# Patient Record
Sex: FEMALE | Race: BLACK OR AFRICAN AMERICAN
[De-identification: names, ages, dates, MRNs, and addresses within clinical notes are randomized per-mention and may not be internally consistent; named-entity substitution may affect disease eponyms.]

---

## 2016-12-28 VITALS
DIASTOLIC BLOOD PRESSURE: 68 MMHG | SYSTOLIC BLOOD PRESSURE: 160 MMHG | SYSTOLIC BLOOD PRESSURE: 160 MMHG | DIASTOLIC BLOOD PRESSURE: 68 MMHG

## 2017-04-27 ENCOUNTER — HOSPITAL ENCOUNTER (OUTPATIENT)
Dept: HOSPITAL 61 - ECHO | Age: 77
Discharge: HOME | End: 2017-04-27
Attending: INTERNAL MEDICINE
Payer: MEDICARE

## 2017-04-27 DIAGNOSIS — I08.1: Primary | ICD-10-CM

## 2017-04-27 PROCEDURE — 93306 TTE W/DOPPLER COMPLETE: CPT

## 2017-04-27 NOTE — CARD
--------------- APPROVED REPORT --------------





EXAM: Two-dimensional and M-mode echocardiogram with Doppler and color Doppler.



Other Information 

Quality : Average

Rhythm : NSR



INDICATION

Cardiac Disease: CAD 



2D DIMENSIONS 

Left Atrium(2D)4.1 (1.6-4.0cm)IVSd1.5 (0.7-1.1cm)

Aortic Root(2D)2.7 (2.0-3.7cm)LVDd5.1 (3.9-5.9cm)

LVOT Diameter2.2 (1.8-2.4cm)PWd1.5 (0.7-1.1cm)

LVDs3.6 (2.5-4.0cm)FS (%) 29.6 %

SV71.2 mlLVEF(%)56.3 (>50%)



Aortic Valve

AoV Peak Mert.145.6cm/sAoV VTI36.8cm

AO Peak GR.8.5mmHgLVOT Peak Mert.115.8cm/s

AO Mean GR.5mmHgAVA (VMAX)3.13cm2



Mitral Valve

MV E Giucwwkp02.4cm/sMV E Peak Gr.5mmHg

MV DECEL [id number] A Eledrkla91.0cm/s

MV E Mean Gr.1mmHgMV SRJ73rf

E/A  Ratio0.9MV A Xwykxthd332md

MVA (PHT)2.97cm2



Tricuspid Valve

TR P. Rptieowk058tc/sRAP BCRPFPIG8xqTj

TR Peak Gr.39zpNmCYJU02ssPj



 LEFT VENTRICLE 

The left ventricle is normal size. There is mild concentric left ventricular hypertrophy. Left ventri
sharmaine systolic function is normal. The Ejection Fraction is 55-60%. There is normal LV segmental wall m
otion. The left ventricular diastolic function and filling is normal for age.



 RIGHT VENTRICLE 

The right ventricle is normal size. The right ventricular systolic function is normal.



 ATRIA 

The left atrium is borderline dilated. The right atrium size is normal. The interatrial septum is int
act with no evidence for an atrial septal defect or patent foramen ovale as noted on 2-D or Doppler i
carmina.



 AORTIC VALVE 

The aortic valve is normal in structure and function. The aortic valve is trileaflet. Doppler and Col
or Flow revealed no significant aortic regurgitation. There is no significant aortic valvular stenosi
s.



 MITRAL VALVE 

The mitral valve is normal in structure and function. There is no mitral valve stenosis. Doppler and 
Color Flow revealed trace to mild mitral regurgitation.



 TRICUSPID VALVE 

The tricuspid valve is normal in structure and function. Doppler and Color Flow revealed mild tricusp
id regurgitation. The PA pressure was estimated at 23 mmHg. There is no tricuspid valve stenosis.



 PULMONIC VALVE 

The pulmonic valve is not well visualized. Doppler and Color Flow revealed no pulmonic valvular regur
gitation. There is no pulmonic valvular stenosis.



 GREAT VESSELS 

The aortic root is normal in size. The IVC is normal in size and collapses >50% with inspiration.



 PERICARDIAL EFFUSION 

There is no evidence of significant pericardial effusion.



Critical Notification

Critical Value: No



<Conclusion>

Left ventricle systolic function is normal.

The Ejection Fraction is 55-60%.

There is normal LV segmental wall motion.

Trace to mild mitral regurgitation.

Mild tricuspid regurgitation.

The PA pressure was estimated at 23 mmHg.

There is no evidence of significant pericardial effusion.

## 2017-05-17 ENCOUNTER — HOSPITAL ENCOUNTER (OUTPATIENT)
Dept: HOSPITAL 61 - NM | Age: 77
Discharge: HOME | End: 2017-05-17
Attending: INTERNAL MEDICINE
Payer: MEDICARE

## 2017-05-17 DIAGNOSIS — I25.10: Primary | ICD-10-CM

## 2017-05-17 PROCEDURE — A9500 TC99M SESTAMIBI: HCPCS

## 2017-05-17 PROCEDURE — 93017 CV STRESS TEST TRACING ONLY: CPT

## 2017-05-17 PROCEDURE — 96376 TX/PRO/DX INJ SAME DRUG ADON: CPT

## 2017-05-17 PROCEDURE — 96375 TX/PRO/DX INJ NEW DRUG ADDON: CPT

## 2017-05-17 PROCEDURE — 78452 HT MUSCLE IMAGE SPECT MULT: CPT

## 2017-05-17 PROCEDURE — 96374 THER/PROPH/DIAG INJ IV PUSH: CPT

## 2017-05-17 NOTE — RAD
--------------- APPROVED REPORT --------------





Test Type:          Pharmacological

Stress Nurse/Tech: Liliya Adame R.N.

Test Indications: CAD

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

Medications:     SEE EMR

Medical History: CVA, DM

Resting ECG:     SR BBB

Resting Heart Rate: 69 bpm

Resting Blood Pressure: 174/65mmHg

Pretest Chest Pain: None



Nurse/Tech Notes

S1S2, lungs coarse throughout, diminished in all lobes. Pt has a hx of COPD with chronic upper respir
atory infections. Former smoker.

Consent: The procedure was explained to the patient in lay terms. Informed consent was witnessed. Rambo
eout was entered into [employer]. History and Stress Test performed by Liliya Adame R.N.



Pharm. Details

Pharmacologic stress testing was performed using 0.4mg per 5ml of regadenoson given intravenously ove
r 7-10 seconds.



Stress Symptoms

Extremely SOA. Retractions noted with inspiration/expiration.



POST EXERCISE

Reason for Termination: Infusion complete

Max HR: 82 bpm

Max Blood Pressure: 157/55mmHg

Blood Pressure response to exercise: Normal blood pressure response during stress.

Heart Rate response to exercise: Normal

Chest Pain: No. 

Arrhythmia: No. 

ST Change: No. 



INTERPRETATION

Stress EKG Conclusion: Baseline EKG showed sinus rhythm.  No ischemic changes at peak stress.  No arr
hythmias.



Imaging Protocol

IMAGE PROTOCOL: Rest Tc-99m/stress Tc-99m 1 day



Rest:            Stress:         Viability:   

Radiopharm.Tc99m QogacnteiRw18y Sestamibi

Dose12.2mCi            30.1mCi            

Duration    15min.           10min.           

Img Date  05/17/2017 05/17/2017      

Inj-Img [id number].           60min.           



Rest Admin Site:IV - Left AntecubitalAdministrator:TABATHA Boss

Stress Admin Site: IV - Left AntecubitalAdministrator: TABATHA Boss



STRESS DATA

End Diast. Vol.106.0mlAv. Heart Rate69.0bpm

End Syst. Vol.32.0mlCO Index BSA0.0L/min

Myocardial Ozww418.0gEject. Mclmvzis59.0%



Stress Rates

Pk. Fill Rate2.65EDV/secLVtime Pk. Fill 126.16msec

Pk. Empty Rate3.94ESV/secLVtime Pk. Bfqnl244.21msec

1/3 Pk. Fill1.65EDV/sec



Stress Scores

Regional WT0.00Summed WT12.00

Regional WM0.00Summed WM3.00



Study quality was good.  Left Ventricular size was Normal at Rest and Stress.

Lung uptake was Normal.  Left Ventricular ejection fraction is 70%.

The rest and stress images show normal perfusion, normal contraction and thickening.



LV Perf. Quant

17 Seg. SSS8.00

17 Seg. SRS1.00

17 Seg. SDS7.00

Stress Defect Extent (% LAD)0.00Rest Defect Extent (% LAD)0.00Rev. Defect Extent (% LAD)0.00

Stress Defect Extent (% LCX) 65.00Rest Defect Extent (% LCX)7.50Rev. Defect Extent (% LCX)65.00

Stress Defect Extent (% RCA)0.00Rest Defect Extent (% RCA)0.00Rev. Defect Extent (% RCA)0.00

Stress Defect Extent (% CAT)12.40Rest Defect Extent (% CAT)1.30Rev. Defect Extent (% CAT)12.40



Conclusion

1. Regadenoson cardioisotope stress test did not show any evidence of ischemia or infarct.

2. Normal left ventricular systolic function with ejection fraction calculated at 70%.

3. Low risk for cardiac events.

## 2018-04-24 ENCOUNTER — HOSPITAL ENCOUNTER (OUTPATIENT)
Dept: HOSPITAL 61 - ENDOS | Age: 78
End: 2018-04-24
Attending: INTERNAL MEDICINE
Payer: COMMERCIAL

## 2018-04-24 DIAGNOSIS — Z89.429: ICD-10-CM

## 2018-04-24 DIAGNOSIS — R19.4: Primary | ICD-10-CM

## 2018-04-24 DIAGNOSIS — Z88.8: ICD-10-CM

## 2018-04-24 DIAGNOSIS — I12.0: ICD-10-CM

## 2018-04-24 DIAGNOSIS — Z90.710: ICD-10-CM

## 2018-04-24 DIAGNOSIS — E11.618: ICD-10-CM

## 2018-04-24 DIAGNOSIS — Z79.82: ICD-10-CM

## 2018-04-24 DIAGNOSIS — E11.22: ICD-10-CM

## 2018-04-24 DIAGNOSIS — E78.5: ICD-10-CM

## 2018-04-24 DIAGNOSIS — G47.33: ICD-10-CM

## 2018-04-24 DIAGNOSIS — Z79.4: ICD-10-CM

## 2018-04-24 DIAGNOSIS — I73.9: ICD-10-CM

## 2018-04-24 DIAGNOSIS — Z79.899: ICD-10-CM

## 2018-04-24 DIAGNOSIS — Z90.49: ICD-10-CM

## 2018-04-24 DIAGNOSIS — Z98.890: ICD-10-CM

## 2018-04-24 DIAGNOSIS — Z88.1: ICD-10-CM

## 2018-04-24 DIAGNOSIS — N18.6: ICD-10-CM

## 2018-04-24 DIAGNOSIS — K21.9: ICD-10-CM

## 2018-04-24 LAB — POC GLUCOSE: 174 MG/DL (ref 70–99)

## 2018-04-24 PROCEDURE — 45378 DIAGNOSTIC COLONOSCOPY: CPT

## 2018-04-24 PROCEDURE — 82962 GLUCOSE BLOOD TEST: CPT

## 2018-04-24 RX ADMIN — SODIUM CHLORIDE, SODIUM LACTATE, POTASSIUM CHLORIDE, AND CALCIUM CHLORIDE 1 MLS/HR: .6; .31; .03; .02 INJECTION, SOLUTION INTRAVENOUS at 07:00

## 2018-09-28 ENCOUNTER — HOSPITAL ENCOUNTER (EMERGENCY)
Dept: HOSPITAL 61 - ER | Age: 78
LOS: 1 days | Discharge: HOME | End: 2018-09-29
Payer: COMMERCIAL

## 2018-09-28 VITALS — BODY MASS INDEX: 22.08 KG/M2 | WEIGHT: 120 LBS | HEIGHT: 62 IN

## 2018-09-28 DIAGNOSIS — Z90.49: ICD-10-CM

## 2018-09-28 DIAGNOSIS — Z88.8: ICD-10-CM

## 2018-09-28 DIAGNOSIS — Z88.1: ICD-10-CM

## 2018-09-28 DIAGNOSIS — Z91.041: ICD-10-CM

## 2018-09-28 DIAGNOSIS — Z88.5: ICD-10-CM

## 2018-09-28 DIAGNOSIS — N18.6: ICD-10-CM

## 2018-09-28 DIAGNOSIS — R42: Primary | ICD-10-CM

## 2018-09-28 DIAGNOSIS — I12.0: ICD-10-CM

## 2018-09-28 DIAGNOSIS — E11.22: ICD-10-CM

## 2018-09-28 DIAGNOSIS — Z99.2: ICD-10-CM

## 2018-09-28 DIAGNOSIS — Z94.0: ICD-10-CM

## 2018-09-28 DIAGNOSIS — Z90.710: ICD-10-CM

## 2018-09-28 LAB
% LYMPHS: 9 % (ref 24–48)
% MONOS: 1 % (ref 0–10)
% SEGS: 90 % (ref 35–66)
ANION GAP SERPL CALC-SCNC: 11 MMOL/L (ref 6–14)
ANISOCYTOSIS BLD QL SMEAR: SLIGHT
BASOPHILS # BLD AUTO: 0 X10^3/UL (ref 0–0.2)
BASOPHILS NFR BLD: 1 % (ref 0–3)
BUN SERPL-MCNC: 29 MG/DL (ref 7–20)
CALCIUM SERPL-MCNC: 9.2 MG/DL (ref 8.5–10.1)
CHLORIDE SERPL-SCNC: 102 MMOL/L (ref 98–107)
CO2 SERPL-SCNC: 26 MMOL/L (ref 21–32)
CREAT SERPL-MCNC: 1.1 MG/DL (ref 0.6–1)
EOSINOPHIL NFR BLD: 0 % (ref 0–3)
EOSINOPHIL NFR BLD: 0 X10^3/UL (ref 0–0.7)
ERYTHROCYTE [DISTWIDTH] IN BLOOD BY AUTOMATED COUNT: 17.1 % (ref 11.5–14.5)
GFR SERPLBLD BASED ON 1.73 SQ M-ARVRAT: 57.8 ML/MIN
GLUCOSE SERPL-MCNC: 222 MG/DL (ref 70–99)
HCT VFR BLD CALC: 34.3 % (ref 36–47)
HGB BLD-MCNC: 11.1 G/DL (ref 12–15.5)
HYPOCHROMIA BLD QL SMEAR: (no result)
LYMPHOCYTES # BLD: 0.5 X10^3/UL (ref 1–4.8)
LYMPHOCYTES NFR BLD AUTO: 8 % (ref 24–48)
MCH RBC QN AUTO: 21 PG (ref 25–35)
MCHC RBC AUTO-ENTMCNC: 32 G/DL (ref 31–37)
MCV RBC AUTO: 66 FL (ref 79–100)
MICROCYTES BLD QL SMEAR: (no result)
MONO #: 0.2 X10^3/UL (ref 0–1.1)
MONOCYTES NFR BLD: 3 % (ref 0–9)
NEUT #: 5.7 X10^3UL (ref 1.8–7.7)
NEUTROPHILS NFR BLD AUTO: 88 % (ref 31–73)
PLATELET # BLD AUTO: 279 X10^3/UL (ref 140–400)
PLATELET # BLD EST: ADEQUATE 10*3/UL
POTASSIUM SERPL-SCNC: 4.2 MMOL/L (ref 3.5–5.1)
RBC # BLD AUTO: 5.23 X10^6/UL (ref 3.5–5.4)
SODIUM SERPL-SCNC: 139 MMOL/L (ref 136–145)
WBC # BLD AUTO: 6.5 X10^3/UL (ref 4–11)

## 2018-09-28 PROCEDURE — 84484 ASSAY OF TROPONIN QUANT: CPT

## 2018-09-28 PROCEDURE — 80048 BASIC METABOLIC PNL TOTAL CA: CPT

## 2018-09-28 PROCEDURE — 82962 GLUCOSE BLOOD TEST: CPT

## 2018-09-28 PROCEDURE — 81001 URINALYSIS AUTO W/SCOPE: CPT

## 2018-09-28 PROCEDURE — 83880 ASSAY OF NATRIURETIC PEPTIDE: CPT

## 2018-09-28 PROCEDURE — 96360 HYDRATION IV INFUSION INIT: CPT

## 2018-09-28 PROCEDURE — 96361 HYDRATE IV INFUSION ADD-ON: CPT

## 2018-09-28 PROCEDURE — 85007 BL SMEAR W/DIFF WBC COUNT: CPT

## 2018-09-28 PROCEDURE — 99285 EMERGENCY DEPT VISIT HI MDM: CPT

## 2018-09-28 PROCEDURE — 93005 ELECTROCARDIOGRAM TRACING: CPT

## 2018-09-28 PROCEDURE — 85025 COMPLETE CBC W/AUTO DIFF WBC: CPT

## 2018-09-28 PROCEDURE — 36415 COLL VENOUS BLD VENIPUNCTURE: CPT

## 2018-09-28 NOTE — PHYS DOC
Past Medical History


Past Medical History:  Diabetes-Type II, Hypertension, Renal Disease


Past Surgical History:  Appendectomy, Hysterectomy


Additional Past Surgical Histo:  KIDNEY TRANSPLANT, R FOREARM DIALYSIS SHUNT


Alcohol Use:  None


Drug Use:  None





Adult General


Chief Complaint


Chief Complaint:  DIZZY/LIGHT HEADED





HPI


HPI





Patient is a 78  year old female who presents with dizziness.  Patient states 

she has been feeling dizzy over the course of today and her symptoms worsened 

this evening. She describes her dizziness to be a feeling of lightheadedness 

and that she may lose her balance or fall. She denies vertiginous symptoms. She 

does state she recently increased her dose of insulin at the recommendation of 

her primary care physician. She has not had chest pain or palpitations. No 

diaphoresis. She has been eating and drinking normally. She denies abdominal 

pain. She has been having normal bowel movements. She denies urinary symptoms.





Review of Systems


Review of Systems





Constitutional: Denies fever or chills 


Eyes: Denies change in visual acuity


HENT: Denies nasal congestion 


Respiratory: Denies cough or shortness of breath 


Cardiovascular: No additional information not addressed in HPI 


GI: Denies abdominal pain


: Denies dysuria


Musculoskeletal: Denies back pain 


Integument: Denies rash or skin lesions 


Neurologic: Denies headache, focal weakness or sensory changes 





All other systems were reviewed and found to be within normal limits, except as 

documented in this note.





Current Medications


Current Medications





Current Medications








 Medications


  (Trade)  Dose


 Ordered  Sig/Basilia  Start Time


 Stop Time Status Last Admin


Dose Admin


 


 Hydralazine HCl


  (Apresoline)  25 mg  1X  ONCE  9/29/18 00:00


 9/29/18 00:01 DC 9/29/18 00:06


25 MG


 


 Sodium Chloride  500 ml @ 


 250 mls/hr  1X  ONCE  9/29/18 00:00


 9/29/18 01:59 DC 9/29/18 00:07


250 MLS/HR











Allergies


Allergies





Allergies








Coded Allergies Type Severity Reaction Last Updated Verified


 


  Iodinated Contrast- Oral and IV Dye Allergy Intermediate Itching 12/28/16 Yes


 


  amoxicillin Allergy Intermediate Swelling 12/28/16 Yes


 


  clavulanic acid Allergy Intermediate Swelling 12/28/16 Yes


 


  diphenhydramine Allergy Intermediate Hives 12/28/16 Yes


 


  erythromycin base Allergy Intermediate Rash 12/28/16 Yes


 


  rofecoxib Allergy Intermediate Rash 12/28/16 Yes











Physical Exam


Physical Exam





Constitutional: Well developed, well nourished, no acute distress, non-toxic 

appearance


HENT: Normocephalic, atraumatic, bilateral external ears normal, oropharynx 

moist


Eyes: PERRLA, EOMI, conjunctiva normal


Neck: Normal range of motion, no tenderness, supple 


Cardiovascular:Heart rate regular rhythm, no murmur 


Lungs & Thorax:  Bilateral breath sounds clear to auscultation 


Abdomen: Bowel sounds normal, soft, no tenderness 


Skin: Warm, dry, no erythema  


Extremities: trace edema bilateral LE's 


Neurologic: Alert and oriented X 3, normal motor function


Psychologic: Affect normal





Current Patient Data


Vital Signs





 Vital Signs








  Date Time  Temp Pulse Resp B/P (MAP) Pulse Ox O2 Delivery O2 Flow Rate FiO2


 


9/29/18 00:06  72  197/79    


 


9/28/18 22:46 98.0  20  98 Room Air  





 98.0       








Lab Values





 Laboratory Tests








Test


 9/28/18


22:59 9/28/18


23:00 9/28/18


23:25 9/29/18


00:03


 


Glucose (Fingerstick)


 193 mg/dL


(70-99)  H 


 


 





 


White Blood Count


 


 6.5 x10^3/uL


(4.0-11.0) 


 





 


Red Blood Count


 


 5.23 x10^6/uL


(3.50-5.40) 


 





 


Hemoglobin


 


 11.1 g/dL


(12.0-15.5)  L 


 





 


Hematocrit


 


 34.3 %


(36.0-47.0)  L 


 





 


Mean Corpuscular Volume


 


 66 fL ()


L 


 





 


Mean Corpuscular Hemoglobin


 


 21 pg (25-35)


L 


 





 


Mean Corpuscular Hemoglobin


Concent 


 32 g/dL


(31-37) 


 





 


Red Cell Distribution Width


 


 17.1 %


(11.5-14.5)  H 


 





 


Platelet Count


 


 279 x10^3/uL


(140-400) 


 





 


Neutrophils (%) (Auto)  88 % (31-73)  H  


 


Lymphocytes (%) (Auto)  8 % (24-48)  L  


 


Monocytes (%) (Auto)  3 % (0-9)    


 


Eosinophils (%) (Auto)  0 % (0-3)    


 


Basophils (%) (Auto)  1 % (0-3)    


 


Neutrophils # (Auto)


 


 5.7 x10^3uL


(1.8-7.7) 


 





 


Lymphocytes # (Auto)


 


 0.5 x10^3/uL


(1.0-4.8)  L 


 





 


Monocytes # (Auto)


 


 0.2 x10^3/uL


(0.0-1.1) 


 





 


Eosinophils # (Auto)


 


 0.0 x10^3/uL


(0.0-0.7) 


 





 


Basophils # (Auto)


 


 0.0 x10^3/uL


(0.0-0.2) 


 





 


Segmented Neutrophils %  90 % (35-66)  H  


 


Lymphocytes %  9 % (24-48)  L  


 


Monocytes %  1 % (0-10)    


 


Platelet Estimate


 


 Adequate


(ADEQUATE) 


 





 


Hypochromasia  Mod    


 


Anisocytosis  Slight    


 


Microcytosis  Marked    


 


Sodium Level


 


 


 139 mmol/L


(136-145) 





 


Potassium Level


 


 


 4.2 mmol/L


(3.5-5.1) 





 


Chloride Level


 


 


 102 mmol/L


() 





 


Carbon Dioxide Level


 


 


 26 mmol/L


(21-32) 





 


Anion Gap   11 (6-14)   


 


Blood Urea Nitrogen


 


 


 29 mg/dL


(7-20)  H 





 


Creatinine


 


 


 1.1 mg/dL


(0.6-1.0)  H 





 


Estimated GFR


(Cockcroft-Gault) 


 


 57.8  


 





 


Glucose Level


 


 


 222 mg/dL


(70-99)  H 





 


Calcium Level


 


 


 9.2 mg/dL


(8.5-10.1) 





 


Troponin I Quantitative


 


 


 < 0.017 ng/mL


(0.000-0.055) 





 


NT-Pro-B-Type Natriuretic


Peptide 


 


 782 pg/mL


(0-449)  H 





 


Urine Collection Type    Unknown  


 


Urine Color    Yellow  


 


Urine Clarity    Cloudy  


 


Urine pH    5.5  


 


Urine Specific Gravity    1.010  


 


Urine Protein


 


 


 


 30 mg/dL


(NEG-TRACE)


 


Urine Glucose (UA)


 


 


 


 Negative mg/dL


(NEG)


 


Urine Ketones (Stick)


 


 


 


 Negative mg/dL


(NEG)


 


Urine Blood


 


 


 


 Negative (NEG)





 


Urine Nitrite


 


 


 


 Negative (NEG)





 


Urine Bilirubin


 


 


 


 Negative (NEG)





 


Urine Urobilinogen Dipstick


 


 


 


 1.0 mg/dL (0.2


mg/dL)


 


Urine Leukocyte Esterase


 


 


 


 Negative (NEG)





 


Urine RBC    0 /HPF (0-2)  


 


Urine WBC


 


 


 


 1-4 /HPF (0-4)





 


Urine Squamous Epithelial


Cells 


 


 


 Mod /LPF  





 


Urine Amorphous Sediment    Present /HPF  


 


Urine Bacteria


 


 


 


 0 /HPF (0-FEW)





 


Urine Mucus    Slight /LPF  


 


Urine Yeast    Present /HPF  





 Laboratory Tests


9/28/18 23:00








 Laboratory Tests


9/28/18 23:25














EKG


EKG


NSR.  No STEMI


Interpretation Time:


23:20





Radiology/Procedures


Radiology/Procedures


[]





Course & Med Decision Making


Course & Med Decision Making


Pertinent Labs and Imaging studies reviewed. (See chart for details)





23:35:  Patient is seen and examined. Her physical exam is normal. She does not 

have chest pain. She complains of dizziness. We'll check basic labs and 

urinalysis. We'll give gentle fluid bolus.





02:00:  All results are reviewed. The patient's creatinine is at baseline 

although mildly elevated. Her troponin is not elevated. The rest of her lab 

panel is unremarkable. The urine is mildly elevated. Suspect the patient to be 

a little bit dehydrated. She was given 250 bolus in the ER. She felt 

subjectively improved. She was ambulated about the department and did ambulate 

at baseline with use of the walker which she normally uses. Her family is 

accompanying her this evening and states that she appears to be at her 

baseline. The patient is requesting discharge home. She is discharged from the 

ER and encouraged to follow-up with her primary care doctor or return to the 

emergency department for any new or worsening symptoms.





Dragon Disclaimer


Dragon Disclaimer


This electronic medical record was generated, in whole or in part, using a 

voice recognition dictation system.





Departure


Departure


Referrals:  


DORIS TORRES MD (PCP)











JUDY SEAMAN DO Sep 28, 2018 23:36

## 2018-09-29 VITALS
DIASTOLIC BLOOD PRESSURE: 81 MMHG | DIASTOLIC BLOOD PRESSURE: 81 MMHG | SYSTOLIC BLOOD PRESSURE: 199 MMHG | SYSTOLIC BLOOD PRESSURE: 199 MMHG

## 2018-09-29 LAB
AMORPH SED URNS QL MICRO: PRESENT /HPF
APTT PPP: YELLOW S
BACTERIA #/AREA URNS HPF: 0 /HPF
BILIRUB UR QL STRIP: NEGATIVE
FIBRINOGEN PPP-MCNC: (no result) MG/DL
NITRITE UR QL STRIP: NEGATIVE
PH UR STRIP: 5.5 [PH]
PROT UR STRIP-MCNC: 30 MG/DL
RBC #/AREA URNS HPF: 0 /HPF (ref 0–2)
SQUAMOUS #/AREA URNS LPF: (no result) /LPF
UROBILINOGEN UR-MCNC: 1 MG/DL
WBC #/AREA URNS HPF: (no result) /HPF (ref 0–4)
YEAST #/AREA URNS HPF: PRESENT /HPF

## 2018-09-29 NOTE — EKG
[facility]

              [address] [place] [address]

Test Date:    2018               Test Time:    23:16:28

Pat Name:     ABBEY CLEMENTS             Department:   

Patient ID:   [id number]           Room:          

Gender:       F                        Technician:   

:          [id number]               Requested By: JUDY SEAMAN

Order Number: [id number]           Reading MD:   Carlos Pavon

                                 Measurements

Intervals                              Axis          

Rate:         68                       P:            90

KS:           192                      QRS:          -36

QRSD:         128                      T:            105

QT:           450                                    

QTc:          483                                    

                           Interpretive Statements

SINUS RHYTHM

ABNORMAL LEFT AXIS DEVIATION

NON SPECIFIC INTRAVENTRICULAR BLOCK

ABNORMAL ECG



Electronically Signed On 10-1-2018 12:27:56 CDT by Carlos Pavon

## 2019-01-16 ENCOUNTER — HOSPITAL ENCOUNTER (OUTPATIENT)
Dept: HOSPITAL 61 - ECHO | Age: 79
Discharge: HOME | End: 2019-01-16
Attending: INTERNAL MEDICINE
Payer: COMMERCIAL

## 2019-01-16 DIAGNOSIS — I70.291: ICD-10-CM

## 2019-01-16 DIAGNOSIS — I25.10: Primary | ICD-10-CM

## 2019-01-16 DIAGNOSIS — R00.8: ICD-10-CM

## 2019-01-16 PROCEDURE — 93306 TTE W/DOPPLER COMPLETE: CPT

## 2019-01-16 PROCEDURE — 93925 LOWER EXTREMITY STUDY: CPT

## 2019-01-16 NOTE — CARD
MR#: [id number]

Account#: [id number]

Accession#: [id number]

Date of Study: 01/16/2019

Ordering Physician: RENEA PAVON, 

Referring Physician: RENEA PAVON 

Tech: Khalida Bowens KAJAL





--------------- APPROVED REPORT --------------





EXAM: Two-dimensional and M-mode echocardiogram with Doppler and color Doppler.



Other Information 

Quality : AverageHR: 61bpm

Rhythm : NSR



INDICATION

CAD 



2D DIMENSIONS 

RVDd3.5 (2.9-3.5cm)Left Atrium(2D)4.2 (1.6-4.0cm)

IVSd1.3 (0.7-1.1cm)Aortic Root(2D)2.8 (2.0-3.7cm)

LVDd4.9 (3.9-5.9cm)LVOT Diameter1.9 (1.8-2.4cm)

PWd1.0 (0.7-1.1cm)LVDs2.9 (2.5-4.0cm)

FS (%) 40.8 %SV81.2 ml

LVEF(%)70.0 (>50%)



M-Mode DIMENSIONS 

Left Atrium(MM)4.94 (2.5-4.0cm)Aortic Root3.28 (2.2-3.7cm)



Aortic Valve

AoV Peak Mert.139.6cm/sAoV VTI34.1cm

AO Peak GR.7.8mmHgLVOT Peak Mert.118.3cm/s

AO Mean GR.5mmHgAVA (VMAX)2.52cm2

TASHA   (VTI)2.50cm2



Mitral Valve

MV E Pazgdasn68.8cm/sMV DECEL [id number]

MV A Gpqzvmxr868.7cm/sE/A  Ratio0.7

MV A Exltjxkv885sc



Pulmonary Valve

PV Peak Jtgwfzqn477.6cm/s



 LEFT VENTRICLE 

The left ventricle is normal size. Proximal septal thickening is noted. The left ventricular systolic
 function is normal. The Ejection Fraction is 65-70%. There is normal LV segmental wall motion. Trans
mitral Doppler flow pattern is Grade I-abnormal relaxation pattern.



 RIGHT VENTRICLE 

The right ventricle is normal size. There is normal right ventricular wall thickness. The right ventr
icular systolic function is normal.



 ATRIA 

The left atrium is mildly dilated. The right atrium size is normal. The interatrial septum is intact 
with no evidence for an atrial septal defect or patent foramen ovale as noted on 2-D or Doppler imagi
ng.



 AORTIC VALVE 

The aortic valve is mildly calcified. The aortic valve is trileaflet. Doppler and Color Flow revealed
 no significant aortic regurgitation. There is no significant aortic valvular stenosis.



 MITRAL VALVE 

The mitral valve is normal in structure and function. There is no evidence of mitral valve prolapse. 
There is no mitral valve stenosis. Doppler and Color-flow revealed trace mitral regurgitation.



 TRICUSPID VALVE 

The tricuspid valve is normal in structure and function. Doppler and Color Flow revealed trace tricus
pid regurgitation. There is no tricuspid valve prolapse or vegetation. There is no tricuspid valve st
enosis.



 PULMONIC VALVE 

Pulmonic valve not well visualized.



 GREAT VESSELS 

The aortic root is normal in size. The ascending aorta is normal in size. The IVC is normal in size a
nd collapses >50% with inspiration.



 PERICARDIAL EFFUSION 

There is no evidence of significant pericardial effusion.



Critical Notification

Critical Value: No



<Conclusion>

The left ventricular systolic function is normal.

The Ejection Fraction is 65-70%.

There is normal LV segmental wall motion.

Transmitral Doppler flow pattern is Grade I-abnormal relaxation pattern.

The left atrium is mildly dilated.

Trace mitral regurgitation.

Trace tricuspid regurgitation.

There is no evidence of significant pericardial effusion.



Signed by : Renea Pavon, 

Electronically Approved : 01/16/2019 14:19:19

## 2019-01-16 NOTE — RAD
MR#: [id number]

Account#: [id number]

Accession#: [id number]

Date of Study: 01/16/2019

Ordering Physician: RENEA SIBLEY, 

Referring Physician: RENEA SIBLEY, 

Tech: Tino Krishnan MBA, RDMS, RVT, RDCS, RTR





--------------- APPROVED REPORT --------------





Patient Location: OUT-PATIENT



Indications

PAD



VELOCITY AND DOPPLER WAVEFORM ANALYSIS

RIGHT cm/secWaveformSeverity LEFT cm/secWaveform Severity 

dCFA 431.0MonophasicdCFA Occluded

Prof Fem Art. 167.0MonophasicProf Fem Art. 

Fem Art Prox. 338.0MonophasicFem Art Prox. Occluded

Fem Art Mid. 273.0MonophasicFem Art Mid. 291.0Monophasic

Fem Art Dist. 149.0MonophasicFem Art Dist. 27.0Monophasic

Pop Art(Fossa) 155.0MonophasicPop Art(AK) 206.0Monophasic

PTA Prox. OccludedPTA Prox. 50.0Monophasic

PTA Dist. 21.0MonophasicPTA Dist. 78.0Monophasic

MARISEL Prox. 80.0MonophasicATA Prox. 22.0Monophasic

DPA 56MonophasicDPA 32Monophasic



Image





Findings

On the right grayscale images demonstrate moderate diffuse atherosclerotic plaque. The posterior tibi
al artery and peroneal artery were not well visualized and are likely occluded.



Spectral waveforms and color Doppler involving the common femoral artery the proximal superficial fem
oral artery and the mid superficial femoral artery demonstrated elevated velocities with a peak systo
lic velocity of 431 cm/s suggestive of greater than 70% stenosis involving the proximal portion of th
e SFA and the entire common femoral artery. Below the knee the proximal and mid segment of the poster
ior tibial artery appears to be occluded. The peroneal artery is not visualized. The anterior tibial 
artery is patent extending to the dorsalis pedis level.



On the left the left common femoral artery and profunda femoris as well as the proximal segment of th
e SFA appear to be occluded with collateralization of the mid SFA with likely greater than 70% stenos
is at the level of the collateral flow. There is diffuse disease in the left lower extremity. The ant
erior tibial artery and peroneal vessels were not well visualized and likely have diffuse disease. Th
ere is patent 1 vessel runoff in the form of the posterior tibial artery.



Critical Notification

Critical Value: No



<Conclusion>

1. 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



Signed by : Juancarlos Avelar, 

Electronically Approved : 01/16/2019 17:39:12